# Patient Record
Sex: MALE | Race: WHITE
[De-identification: names, ages, dates, MRNs, and addresses within clinical notes are randomized per-mention and may not be internally consistent; named-entity substitution may affect disease eponyms.]

---

## 2017-06-06 ENCOUNTER — HOSPITAL ENCOUNTER (OUTPATIENT)
Dept: HOSPITAL 45 - C.RAD | Age: 37
Discharge: HOME | End: 2017-06-06
Attending: FAMILY MEDICINE
Payer: COMMERCIAL

## 2017-06-06 DIAGNOSIS — J18.9: Primary | ICD-10-CM

## 2017-06-06 NOTE — DIAGNOSTIC IMAGING REPORT
TWO VIEW CHEST



CLINICAL HISTORY: Cough.



FINDINGS: PA and lateral chest radiographs are obtained. No prior studies are

available for comparison at the time of dictation.  The PA view is degraded by

patient rotation. The patient is status post midline sternotomy. The heart is

enlarged. The pulmonary vasculature is noncongested.  The lungs and pleural

spaces are clear. There is no pneumothorax. The bony thorax appears intact.



IMPRESSION: Cardiomegaly with no acute cardiopulmonary abnormality.







Electronically signed by:  Jameson Gallardo M.D.

6/6/2017 5:24 PM



Dictated Date/Time:  6/6/2017 5:23 PM

## 2017-10-13 ENCOUNTER — HOSPITAL ENCOUNTER (OUTPATIENT)
Dept: HOSPITAL 45 - C.CTS | Age: 37
Discharge: HOME | End: 2017-10-13
Attending: INTERNAL MEDICINE
Payer: COMMERCIAL

## 2017-10-13 DIAGNOSIS — I35.0: Primary | ICD-10-CM

## 2017-10-13 DIAGNOSIS — I77.810: ICD-10-CM

## 2017-10-13 DIAGNOSIS — I37.0: ICD-10-CM

## 2017-10-13 NOTE — DIAGNOSTIC IMAGING REPORT
CT OF THE CHEST WITHOUT CONTRAST AND CT ANGIOGRAPHY OF THE CHEST



CLINICAL HISTORY: Status post Ross surgery. Severe subaortic stenosis, aortic

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



COMPARISON STUDY:  No previous studies for comparison.



TECHNIQUE: Initially, unenhanced axial images of the chest were obtained.

Arterial phase imaging of the chest was then performed following intravenous

injection of 92 cc of Optiray 320 IV. Sagittal and coronal reconstructions were

viewed as well as maximal intensity projections on an independent 3-D

workstation.



FINDINGS: The heart is moderately enlarged. There is no pericardial effusion.

There are findings consistent with aortic valve replacement. The caliber at the

level of the aortic valve is normal. However, there is dilatation of the aortic

root which measures 6.9 x 6.1 cm, just distal to the valve. The distal ascending

aorta measures 5.3 cm. The caliber of the descending thoracic aorta is normal.

There is no dissection. Note is made of extensive calcification of the right

ventricular outflow tract at site of pulmonary homograft. Dilatation of the

right heart chambers may be due to elevated pressures. No enlarged thoracic

lymph nodes are present. Central airways are patent. There is no consolidation

to suggest pneumonia. No pneumothorax or pleural effusion is present. Mild left

pleural calcification is chronic. A gallstone is noted within the gallbladder.

The upper abdomen is otherwise unremarkable.



IMPRESSION:  



1. Postoperative findings consistent with Ross procedure. Dilatation of the

aortic root which measures 6.9 x 6.1 cm, just distal to the replaced aortic

valve. Moderate dilatation of the distal ascending aorta with normal caliber

descending thoracic aorta. No dissection. Extensive calcification of the right

ventricular outflow tract, likely at site of pulmonary homograft.



2. Moderate cardiomegaly.



3. No acute intrathoracic findings.



4. Cholelithiasis.









Electronically signed by:  Joseph Mcintyre M.D.

10/13/2017 10:41 AM



Dictated Date/Time:  10/13/2017 10:18 AM

## 2018-02-19 ENCOUNTER — HOSPITAL ENCOUNTER (OUTPATIENT)
Dept: HOSPITAL 45 - C.LABSPEC | Age: 38
Discharge: HOME | End: 2018-02-19
Attending: INTERNAL MEDICINE
Payer: COMMERCIAL

## 2018-02-19 DIAGNOSIS — Z01.89: Primary | ICD-10-CM

## 2018-02-19 LAB
BASOPHILS # BLD: 0.05 K/UL (ref 0–0.2)
BASOPHILS NFR BLD: 0.8 %
BUN SERPL-MCNC: 13 MG/DL (ref 7–18)
CALCIUM SERPL-MCNC: 9 MG/DL (ref 8.5–10.1)
CO2 SERPL-SCNC: 26 MMOL/L (ref 21–32)
CREAT SERPL-MCNC: 0.83 MG/DL (ref 0.6–1.4)
EOS ABS #: 0.4 K/UL (ref 0–0.5)
EOSINOPHIL NFR BLD AUTO: 431 K/UL (ref 130–400)
GLUCOSE SERPL-MCNC: 125 MG/DL (ref 70–99)
HCT VFR BLD CALC: 37.1 % (ref 42–52)
HGB BLD-MCNC: 12.4 G/DL (ref 14–18)
IG#: 0.01 K/UL (ref 0–0.02)
IMM GRANULOCYTES NFR BLD AUTO: 15.5 %
LYMPHOCYTES # BLD: 0.93 K/UL (ref 1.2–3.4)
MCH RBC QN AUTO: 29.7 PG (ref 25–34)
MCHC RBC AUTO-ENTMCNC: 33.4 G/DL (ref 32–36)
MCV RBC AUTO: 88.8 FL (ref 80–100)
MONO ABS #: 0.47 K/UL (ref 0.11–0.59)
MONOCYTES NFR BLD: 7.8 %
NEUT ABS #: 4.14 K/UL (ref 1.4–6.5)
NEUTROPHILS # BLD AUTO: 6.7 %
NEUTROPHILS NFR BLD AUTO: 69 %
PMV BLD AUTO: 9 FL (ref 7.4–10.4)
POTASSIUM SERPL-SCNC: 4.1 MMOL/L (ref 3.5–5.1)
RED CELL DISTRIBUTION WIDTH CV: 14.4 % (ref 11.5–14.5)
RED CELL DISTRIBUTION WIDTH SD: 46 FL (ref 36.4–46.3)
SODIUM SERPL-SCNC: 137 MMOL/L (ref 136–145)
WBC # BLD AUTO: 6 K/UL (ref 4.8–10.8)